# Patient Record
Sex: FEMALE | Race: OTHER | HISPANIC OR LATINO | Employment: OTHER | URBAN - METROPOLITAN AREA
[De-identification: names, ages, dates, MRNs, and addresses within clinical notes are randomized per-mention and may not be internally consistent; named-entity substitution may affect disease eponyms.]

---

## 2017-10-09 ENCOUNTER — HOSPITAL ENCOUNTER (EMERGENCY)
Facility: HOSPITAL | Age: 77
Discharge: HOME | End: 2017-10-09
Attending: EMERGENCY MEDICINE | Admitting: EMERGENCY MEDICINE
Payer: COMMERCIAL

## 2017-10-09 VITALS
HEART RATE: 71 BPM | DIASTOLIC BLOOD PRESSURE: 70 MMHG | TEMPERATURE: 97.5 F | SYSTOLIC BLOOD PRESSURE: 175 MMHG | OXYGEN SATURATION: 98 % | RESPIRATION RATE: 16 BRPM

## 2017-10-09 DIAGNOSIS — H93.19 TINNITUS: Primary | ICD-10-CM

## 2017-10-09 PROCEDURE — 99283 EMERGENCY DEPT VISIT LOW MDM: CPT

## 2017-10-09 RX ORDER — ENALAPRIL MALEATE 10 MG/1
40 TABLET ORAL
COMMUNITY

## 2017-10-09 RX ORDER — SIMVASTATIN 40 MG
40 TABLET ORAL
COMMUNITY

## 2017-10-09 RX ORDER — GABAPENTIN 800 MG/1
800 TABLET ORAL
COMMUNITY

## 2017-10-09 NOTE — DISCHARGE INSTRUCTIONS
Tinnitus, cuidados ambulatorios   INFORMACIÓN GENERAL:   Tinnitus  es cuando usted escucha un campanilleo, chasquido, zumbido o siseo en guillaume o ambos oídos  También se puede percibir en forma de silbido, ronroneo, estruendo o cantar de grillos, entre otros sonidos  El nikita puede ser leve o alto, con un raman grave o juan  El tinnitus puede ser causado por problemas con el [place]  El sistema auditivo consiste de ferreira oreja y el nervio que conecta a ferreira oído con el cerebro  Las partes del cerebro que distinguen los sonidos también son parte del [place]  El tinnitus puede ser causado por afecciones de fran, angelito la enfermedad de [place]  El tinnitus que dura más de 6 meses es considerado crónico    Consultar con ferreira proveedor de fran al presentar los siguientes síntomas:   · Mily de lianna frecuentes    · Cansancio y le rony concentrarse o recordar cosas    · Mayor ansiedad o estrés    · Inmensa tristeza o depresión    · Dificultad para conciliar el sueño o quedarse dormido    · Síntomas que no desaparecen o se empeoran    · Preguntas o inquietudes sobre ferreira condición o tratamiento  El tratamiento para el tinnitus  Es posible que no necesite tratamiento  Vern síntomas sólo podrían regresar cuando usted se encuentre ansioso o estresado  Ferreira proveedor de fran podría suspenderle ciertos medicamentos que le estén causando ferreira tinnitus  Usted podría necesitar medicamentos para aliviar vern síntomas  Usted podría necesitar alguno de los siguientes:  · El asesoramiento  sirve para enseñarle formas para relajarse, disminuir el estrés y que escuche menos los zumbidos en el oído  · La terapia cognitivo-conductual  sirve para comprender ferreira condición  El terapeuta le ayudará a sobrellevar ferreira tinnitus  También puede aprender nuevas formas para relajarse y adquirir habilidades para atenuar los síntomas      · La terapia de nikita , angelito las máquinas que emiten un nikita Zachariah, pueden ayudar a [employer] zumbidos con un nikita placentero  La terapia de nikita puede ayudarlo a quedarse dormido o a relajarse  Estos aparatos pueden llevarse en lal oreja o ser colocados sobre lal mesita de noche  · Los aparatos auditivos o el implante coclear  pueden servir si usted tiene pérdida de la audición  · La cirugía  se podría necesitar si lal tinnitus es causada por deepa anormalidad en las arterias o si tiene deepa masa  Controle vern síntomas:   · No fume  Si usted fuma, nunca es tarde para dejar de fumar  El tabaquismo puede disminuir el flujo de laura a lal oído y empeorar lal tinnitus  Solicite información si necesita ayuda para dejar de fumar  · Disminuya el consumo de alcohol y cafeína  El alcohol y la cafeína pueden empeorar los zumbidos  Prevenir el tinnitus:   · Evite la exposición a sonidos elmer , angelito música hillary o herramientas eléctricas  La exposición ocasional incluso podría causar tinnitus  Aléjese del ruido o baje el volumen  · Use protección auditiva  cuando vaya a estar expuesto a sonidos elmer  Deepa buena protección auditiva consta de tapones para los oídos o orejeras para reducir el nikita  Programe deepa don con lal proveedor de [employer] se le haya indicado: Anote vern preguntas para que se acuerde de hacerlas uri vern visitas  ACUERDOS SOBRE LAL CUIDADO:   Usted tiene el derecho de participar en la planificación de lal cuidado  Aprenda todo lo que pueda sobre lal condición y angelito darle tratamiento  Discuta con vern médicos vern opciones de tratamiento para juntos decidir el cuidado que usted quiere recibir  Usted siempre tiene el derecho a rechazar lal tratamiento  Esta información es sólo para uso en educación  Lal intención no es darle un consejo médico sobre enfermedades o tratamientos  Colsulte con lal [employer] farmacéutico antes de seguir cualquier régimen médico para saber si es seguro y efectivo para usted    © 2014 [address] is for End User's use only and may not be sold, redistributed or otherwise used for commercial purposes  All illustrations and images included in CareNotes® are the copyrighted property of A D A M , Inc  or Alden Valiente

## 2017-10-09 NOTE — ED PROVIDER NOTES
History  Chief Complaint   Patient presents with    Tinnitus     ringing in ears for past few months  denies headache, denies dizziness  78-year-old female visiting family from [facility] presents for evaluation of tinnitus for several months  Patient describes a ringing sensation in her ears for the past several months  She states that is intermittent, without modifying factors, unchanged today  Patient has never had similar symptoms  She denies working or being exposed to loud noises chronically  She denies headache, nausea, vomiting, fevers, chills, cough, UR symptoms, vertigo, focal neuro deficits or weakness, headache, NSAID use, new medications  Prior to Admission Medications   Prescriptions Last Dose Informant Patient Reported? Taking?   enalapril (VASOTEC) 10 mg tablet 10/8/2017 at 1800  Yes Yes   Sig: Take 40 mg by mouth daily at bedtime   gabapentin (NEURONTIN) 800 mg tablet 10/8/2017 at 1800  Yes Yes   Sig: Take 800 mg by mouth daily at bedtime   insulin NPH (HumuLIN N,NovoLIN N) 100 Units/mL subcutaneous injection 10/8/2017 at 1800  Yes Yes   Sig: Inject 65 Units under the skin daily before dinner   simvastatin (ZOCOR) 40 mg tablet 10/8/2017 at 1800  Yes Yes   Sig: Take 40 mg by mouth daily at bedtime      Facility-Administered Medications: None       Past Medical History:   Diagnosis Date    Cardiac disease     Diabetes mellitus ([facility] [address] )        Past Surgical History:   Procedure Laterality Date    BREAST SURGERY         History reviewed  No pertinent family history  I have reviewed and agree with the history as documented  Social History   Substance Use Topics    Smoking status: Never Smoker    Smokeless tobacco: Never Used    Alcohol use No        Review of Systems   Constitutional: Negative for activity change, appetite change, fatigue and fever  HENT: Negative for congestion, dental problem, ear discharge, ear pain, hearing loss, rhinorrhea and sore throat      Eyes: Negative for pain and redness  Respiratory: Negative for chest tightness, shortness of breath and wheezing  Cardiovascular: Negative for chest pain and palpitations  Gastrointestinal: Negative for abdominal pain, blood in stool, constipation, diarrhea, nausea and vomiting  Endocrine: Negative for cold intolerance and heat intolerance  Genitourinary: Negative for dysuria, frequency and hematuria  Musculoskeletal: Negative for arthralgias and myalgias  Skin: Negative for color change, pallor and rash  Neurological: Negative for weakness and numbness  Hematological: Does not bruise/bleed easily  Psychiatric/Behavioral: Negative for agitation, hallucinations and suicidal ideas  Physical Exam  ED Triage Vitals   Temperature Pulse Respirations Blood Pressure SpO2   10/09/17 1100 10/09/17 1100 10/09/17 1100 10/09/17 1101 10/09/17 1100   97 5 °F (36 4 °C) 71 16 (!) 200/86 98 %      Temp Source Heart Rate Source Patient Position - Orthostatic VS BP Location FiO2 (%)   10/09/17 1100 10/09/17 1100 10/09/17 1100 10/09/17 1100 --   Temporal Monitor Sitting Right arm       Pain Score       10/09/17 1100       No Pain           Physical Exam   Constitutional: She is oriented to person, place, and time  She appears well-developed and well-nourished  HENT:   Mouth/Throat: No oropharyngeal exudate  TMs normal bilaterally no pharyngeal erythema no rhinorrhea nontender palpation of sinuses, normal looking turbinates   Eyes: Conjunctivae and EOM are normal    Neck: Normal range of motion  Neck supple  No meningeal signs   Cardiovascular: Normal rate, regular rhythm, normal heart sounds and intact distal pulses  Pulmonary/Chest: Effort normal and breath sounds normal  No respiratory distress  She has no wheezes  She has no rales  She exhibits no tenderness  Abdominal: Soft  Bowel sounds are normal  She exhibits no distension and no mass  There is no tenderness  No hernia     No cvat   Musculoskeletal: Normal range of motion  She exhibits no edema  Lymphadenopathy:     She has no cervical adenopathy  Neurological: She is alert and oriented to person, place, and time  No cranial nerve deficit    nml cerebellar exam, nml strength/sensation, nml gait   Skin: No rash noted  No erythema  No edema   Psychiatric: She has a normal mood and affect  Her behavior is normal    Nursing note and vitals reviewed  ED Medications  Medications - No data to display    Diagnostic Studies  Labs Reviewed - No data to display    No orders to display       Procedures  Procedures      Phone Contacts  ED Phone Contact    ED Course  ED Course                                MDM  Number of Diagnoses or Management Options  Tinnitus:   Diagnosis management comments: Tinnitus with benign exam and absence of other ocmplaints-will refer to ent      CritCare Time    Disposition  Final diagnoses:   Tinnitus     ED Disposition     ED Disposition Condition Comment    Discharge  Stephen Dominguez discharge to home/self care  Condition at discharge: Good        Follow-up Information     Follow up With Specialties Details Why Justin S  Sol,  [facility]  [address]          Discharge Medication List as of 10/9/2017 11:49 AM      CONTINUE these medications which have NOT CHANGED    Details   enalapril (VASOTEC) 10 mg tablet Take 40 mg by mouth daily at bedtime, Historical Med      gabapentin (NEURONTIN) 800 mg tablet Take 800 mg by mouth daily at bedtime, Historical Med      insulin NPH (HumuLIN N,NovoLIN N) 100 Units/mL subcutaneous injection Inject 65 Units under the skin daily before dinner, Historical Med      simvastatin (ZOCOR) 40 mg tablet Take 40 mg by mouth daily at bedtime, Historical Med           No discharge procedures on file      ED Provider  Electronically Signed by       Stephane Wolff MD  10/09/17 1137